# Patient Record
Sex: FEMALE | Race: WHITE | Employment: FULL TIME | URBAN - METROPOLITAN AREA
[De-identification: names, ages, dates, MRNs, and addresses within clinical notes are randomized per-mention and may not be internally consistent; named-entity substitution may affect disease eponyms.]

---

## 2021-11-16 ENCOUNTER — HISTORICAL (OUTPATIENT)
Dept: LAB | Facility: HOSPITAL | Age: 21
End: 2021-11-16

## 2021-11-16 LAB
T4 FREE SERPL-MCNC: 1 NG/DL (ref 0.7–1.48)
TSH SERPL-ACNC: 1.92 UIU/ML (ref 0.35–4.94)

## 2023-03-27 ENCOUNTER — HOSPITAL ENCOUNTER (EMERGENCY)
Facility: HOSPITAL | Age: 23
Discharge: HOME | End: 2023-03-27
Attending: INTERNAL MEDICINE
Payer: COMMERCIAL

## 2023-03-27 VITALS
WEIGHT: 140 LBS | BODY MASS INDEX: 26.43 KG/M2 | DIASTOLIC BLOOD PRESSURE: 88 MMHG | RESPIRATION RATE: 17 BRPM | SYSTOLIC BLOOD PRESSURE: 143 MMHG | HEIGHT: 61 IN | TEMPERATURE: 98 F | OXYGEN SATURATION: 100 % | HEART RATE: 103 BPM

## 2023-03-27 DIAGNOSIS — R00.2 PALPITATIONS: Primary | ICD-10-CM

## 2023-03-27 DIAGNOSIS — R00.0 TACHYCARDIA: ICD-10-CM

## 2023-03-27 DIAGNOSIS — F41.9 ANXIETY: ICD-10-CM

## 2023-03-27 DIAGNOSIS — E87.6 HYPOKALEMIA: ICD-10-CM

## 2023-03-27 LAB
ALBUMIN SERPL-MCNC: 4.4 G/DL (ref 3.5–5)
ALBUMIN/GLOB SERPL: 1.5 RATIO (ref 1.1–2)
ALP SERPL-CCNC: 90 UNIT/L (ref 40–150)
ALT SERPL-CCNC: 16 UNIT/L (ref 0–55)
APPEARANCE UR: CLEAR
AST SERPL-CCNC: 18 UNIT/L (ref 5–34)
B-HCG SERPL QL: NEGATIVE
BASOPHILS # BLD AUTO: 0.03 X10(3)/MCL (ref 0–0.2)
BASOPHILS NFR BLD AUTO: 0.4 %
BILIRUB UR QL STRIP.AUTO: NEGATIVE MG/DL
BILIRUBIN DIRECT+TOT PNL SERPL-MCNC: 0.4 MG/DL
BNP BLD-MCNC: <10 PG/ML
BUN SERPL-MCNC: 10 MG/DL (ref 7–18.7)
CALCIUM SERPL-MCNC: 9.6 MG/DL (ref 8.4–10.2)
CHLORIDE SERPL-SCNC: 108 MMOL/L (ref 98–107)
CO2 SERPL-SCNC: 23 MMOL/L (ref 22–29)
COLOR UR AUTO: YELLOW
CREAT SERPL-MCNC: 0.76 MG/DL (ref 0.55–1.02)
EOSINOPHIL # BLD AUTO: 0.08 X10(3)/MCL (ref 0–0.9)
EOSINOPHIL NFR BLD AUTO: 0.9 %
ERYTHROCYTE [DISTWIDTH] IN BLOOD BY AUTOMATED COUNT: 11.9 % (ref 11.5–17)
GFR SERPLBLD CREATININE-BSD FMLA CKD-EPI: >60 MLS/MIN/1.73/M2
GLOBULIN SER-MCNC: 3 GM/DL (ref 2.4–3.5)
GLUCOSE SERPL-MCNC: 107 MG/DL (ref 74–100)
GLUCOSE UR QL STRIP.AUTO: NEGATIVE MG/DL
HCT VFR BLD AUTO: 40.5 % (ref 37–47)
HGB BLD-MCNC: 13.9 G/DL (ref 12–16)
IMM GRANULOCYTES # BLD AUTO: 0.02 X10(3)/MCL (ref 0–0.04)
IMM GRANULOCYTES NFR BLD AUTO: 0.2 %
KETONES UR QL STRIP.AUTO: ABNORMAL MG/DL
LACTATE SERPL-SCNC: 0.7 MMOL/L (ref 0.5–2.2)
LEUKOCYTE ESTERASE UR QL STRIP.AUTO: NEGATIVE UNIT/L
LYMPHOCYTES # BLD AUTO: 2.8 X10(3)/MCL (ref 0.6–4.6)
LYMPHOCYTES NFR BLD AUTO: 33 %
MAGNESIUM SERPL-MCNC: 1.9 MG/DL (ref 1.6–2.6)
MCH RBC QN AUTO: 29 PG (ref 27–31)
MCHC RBC AUTO-ENTMCNC: 34.3 G/DL (ref 33–36)
MCV RBC AUTO: 84.6 FL (ref 80–94)
MONOCYTES # BLD AUTO: 0.62 X10(3)/MCL (ref 0.1–1.3)
MONOCYTES NFR BLD AUTO: 7.3 %
NEUTROPHILS # BLD AUTO: 4.93 X10(3)/MCL (ref 2.1–9.2)
NEUTROPHILS NFR BLD AUTO: 58.2 %
NITRITE UR QL STRIP.AUTO: NEGATIVE
PH UR STRIP.AUTO: 7 [PH]
PLATELET # BLD AUTO: 325 X10(3)/MCL (ref 130–400)
PMV BLD AUTO: 10.4 FL (ref 7.4–10.4)
POTASSIUM SERPL-SCNC: 3.1 MMOL/L (ref 3.5–5.1)
PROT SERPL-MCNC: 7.4 GM/DL (ref 6.4–8.3)
PROT UR QL STRIP.AUTO: NEGATIVE MG/DL
RBC # BLD AUTO: 4.79 X10(6)/MCL (ref 4.2–5.4)
RBC UR QL AUTO: NEGATIVE UNIT/L
SODIUM SERPL-SCNC: 140 MMOL/L (ref 136–145)
SP GR UR STRIP.AUTO: 1.02
UROBILINOGEN UR STRIP-ACNC: 0.2 MG/DL
WBC # SPEC AUTO: 8.5 X10(3)/MCL (ref 4.5–11.5)

## 2023-03-27 PROCEDURE — 80053 COMPREHEN METABOLIC PANEL: CPT | Performed by: INTERNAL MEDICINE

## 2023-03-27 PROCEDURE — 85025 COMPLETE CBC W/AUTO DIFF WBC: CPT | Performed by: INTERNAL MEDICINE

## 2023-03-27 PROCEDURE — 81003 URINALYSIS AUTO W/O SCOPE: CPT | Performed by: INTERNAL MEDICINE

## 2023-03-27 PROCEDURE — 63600175 PHARM REV CODE 636 W HCPCS: Performed by: INTERNAL MEDICINE

## 2023-03-27 PROCEDURE — 93005 ELECTROCARDIOGRAM TRACING: CPT

## 2023-03-27 PROCEDURE — 96361 HYDRATE IV INFUSION ADD-ON: CPT

## 2023-03-27 PROCEDURE — 99284 EMERGENCY DEPT VISIT MOD MDM: CPT | Mod: 25

## 2023-03-27 PROCEDURE — 93010 ELECTROCARDIOGRAM REPORT: CPT | Mod: ,,, | Performed by: INTERNAL MEDICINE

## 2023-03-27 PROCEDURE — 25000003 PHARM REV CODE 250: Performed by: INTERNAL MEDICINE

## 2023-03-27 PROCEDURE — 83735 ASSAY OF MAGNESIUM: CPT | Performed by: INTERNAL MEDICINE

## 2023-03-27 PROCEDURE — 81025 URINE PREGNANCY TEST: CPT | Performed by: INTERNAL MEDICINE

## 2023-03-27 PROCEDURE — 96365 THER/PROPH/DIAG IV INF INIT: CPT

## 2023-03-27 PROCEDURE — 83880 ASSAY OF NATRIURETIC PEPTIDE: CPT | Performed by: INTERNAL MEDICINE

## 2023-03-27 PROCEDURE — 93010 EKG 12-LEAD: ICD-10-PCS | Mod: ,,, | Performed by: INTERNAL MEDICINE

## 2023-03-27 PROCEDURE — 83605 ASSAY OF LACTIC ACID: CPT | Performed by: INTERNAL MEDICINE

## 2023-03-27 RX ORDER — MAGNESIUM SULFATE HEPTAHYDRATE 40 MG/ML
2 INJECTION, SOLUTION INTRAVENOUS
Status: COMPLETED | OUTPATIENT
Start: 2023-03-27 | End: 2023-03-27

## 2023-03-27 RX ORDER — BUSPIRONE HYDROCHLORIDE 15 MG/1
15 TABLET ORAL 3 TIMES DAILY
COMMUNITY

## 2023-03-27 RX ORDER — LEVOTHYROXINE SODIUM 25 UG/1
25 TABLET ORAL
COMMUNITY

## 2023-03-27 RX ORDER — LEVOCETIRIZINE DIHYDROCHLORIDE 5 MG/1
5 TABLET, FILM COATED ORAL NIGHTLY
COMMUNITY

## 2023-03-27 RX ORDER — PROPRANOLOL HYDROCHLORIDE 20 MG/1
20 TABLET ORAL
Status: COMPLETED | OUTPATIENT
Start: 2023-03-27 | End: 2023-03-27

## 2023-03-27 RX ORDER — SODIUM CHLORIDE 9 MG/ML
125 INJECTION, SOLUTION INTRAVENOUS ONCE
Status: COMPLETED | OUTPATIENT
Start: 2023-03-27 | End: 2023-03-27

## 2023-03-27 RX ORDER — PROPRANOLOL HYDROCHLORIDE 20 MG/1
20 TABLET ORAL EVERY 8 HOURS PRN
Qty: 90 TABLET | Refills: 0 | Status: SHIPPED | OUTPATIENT
Start: 2023-03-27 | End: 2023-04-26

## 2023-03-27 RX ADMIN — PROPRANOLOL HYDROCHLORIDE 20 MG: 20 TABLET ORAL at 05:03

## 2023-03-27 RX ADMIN — SODIUM CHLORIDE 1000 ML: 9 INJECTION, SOLUTION INTRAVENOUS at 03:03

## 2023-03-27 RX ADMIN — POTASSIUM BICARBONATE 40 MEQ: 782 TABLET, EFFERVESCENT ORAL at 04:03

## 2023-03-27 RX ADMIN — MAGNESIUM SULFATE HEPTAHYDRATE 2 G: 40 INJECTION, SOLUTION INTRAVENOUS at 04:03

## 2023-03-27 RX ADMIN — SODIUM CHLORIDE 125 ML/HR: 9 INJECTION, SOLUTION INTRAVENOUS at 04:03

## 2023-03-27 NOTE — ED PROVIDER NOTES
Encounter Date: 3/27/2023       History     Chief Complaint   Patient presents with    Palpitations     Pt states she was working when she felt chest pressure and palpitations.     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  She has a history of anxiety and is on BuSpar so she took whatever BuSpar and stated that it did not help and she continued to feel worse and lightheaded with palpitations so she came to the emergency department to have a an evaluation done    Review of patient's allergies indicates:  No Known Allergies  History reviewed. No pertinent past medical history.  No past surgical history on file.  History reviewed. No pertinent family history.     Review of Systems   Constitutional: Negative.  Negative for activity change, appetite change, chills, diaphoresis, fatigue, fever and unexpected weight change.   HENT: Negative.  Negative for congestion, dental problem, drooling, ear discharge, ear pain, facial swelling, hearing loss, mouth sores, nosebleeds, postnasal drip, rhinorrhea, sinus pressure, sinus pain, sneezing, sore throat, tinnitus, trouble swallowing and voice change.    Eyes: Negative.  Negative for photophobia, pain, discharge, redness, itching and visual disturbance.   Respiratory:  Positive for chest tightness. Negative for apnea, cough, choking, shortness of breath, wheezing and stridor.    Cardiovascular:  Positive for palpitations. Negative for chest pain and leg swelling.   Gastrointestinal: Negative.  Negative for abdominal distention, abdominal pain, anal bleeding, blood in stool, constipation, diarrhea, nausea, rectal pain and vomiting.   Endocrine: Negative.  Negative for cold intolerance, heat intolerance, polydipsia, polyphagia and polyuria.   Genitourinary: Negative.  Negative for decreased urine volume, difficulty urinating, dyspareunia, dysuria, enuresis, flank pain, frequency, genital sores, hematuria, menstrual  problem, pelvic pain, urgency, vaginal bleeding, vaginal discharge and vaginal pain.   Musculoskeletal: Negative.  Negative for arthralgias, back pain, gait problem, joint swelling, myalgias, neck pain and neck stiffness.   Skin: Negative.  Negative for color change, pallor, rash and wound.   Allergic/Immunologic: Negative.  Negative for environmental allergies, food allergies and immunocompromised state.   Neurological: Negative.  Negative for dizziness, tremors, seizures, syncope, facial asymmetry, speech difficulty, weakness, light-headedness, numbness and headaches.   Hematological: Negative.  Negative for adenopathy. Does not bruise/bleed easily.   Psychiatric/Behavioral: Negative.  Negative for agitation, behavioral problems, confusion, decreased concentration, dysphoric mood, hallucinations, self-injury, sleep disturbance and suicidal ideas. The patient is not nervous/anxious and is not hyperactive.    All other systems reviewed and are negative.    Physical Exam     Initial Vitals [03/27/23 0250]   BP Pulse Resp Temp SpO2   (!) 160/117 (!) 118 20 98 °F (36.7 °C) 100 %      MAP       --         Physical Exam    Nursing note and vitals reviewed.  Constitutional: She appears well-developed and well-nourished. She is not diaphoretic. No distress.   HENT:   Head: Normocephalic and atraumatic.   Mouth/Throat: Oropharynx is clear and moist. No oropharyngeal exudate.   Eyes: Conjunctivae and EOM are normal. Pupils are equal, round, and reactive to light. No scleral icterus.   Neck: Neck supple. No JVD present.   Normal range of motion.  Cardiovascular:  Regular rhythm, normal heart sounds and intact distal pulses.     Exam reveals no gallop and no friction rub.       No murmur heard.  Tachycardic   Pulmonary/Chest: Breath sounds normal. No respiratory distress. She has no wheezes. She exhibits no tenderness.   Abdominal: Abdomen is soft. Bowel sounds are normal. She exhibits no distension. There is no abdominal  tenderness. There is no rebound.   Musculoskeletal:         General: Normal range of motion.      Cervical back: Normal range of motion and neck supple.     Neurological: She is alert and oriented to person, place, and time. She has normal strength and normal reflexes.   Skin: Skin is warm and dry. Capillary refill takes less than 2 seconds.   Psychiatric: Her behavior is normal. Judgment and thought content normal. Her mood appears anxious.       ED Course   Procedures  Admission on 03/27/2023   Component Date Value Ref Range Status    Sodium Level 03/27/2023 140  136 - 145 mmol/L Final    Potassium Level 03/27/2023 3.1 (L)  3.5 - 5.1 mmol/L Final    Chloride 03/27/2023 108 (H)  98 - 107 mmol/L Final    Carbon Dioxide 03/27/2023 23  22 - 29 mmol/L Final    Glucose Level 03/27/2023 107 (H)  74 - 100 mg/dL Final    Blood Urea Nitrogen 03/27/2023 10.0  7.0 - 18.7 mg/dL Final    Creatinine 03/27/2023 0.76  0.55 - 1.02 mg/dL Final    Calcium Level Total 03/27/2023 9.6  8.4 - 10.2 mg/dL Final    Protein Total 03/27/2023 7.4  6.4 - 8.3 gm/dL Final    Albumin Level 03/27/2023 4.4  3.5 - 5.0 g/dL Final    Globulin 03/27/2023 3.0  2.4 - 3.5 gm/dL Final    Albumin/Globulin Ratio 03/27/2023 1.5  1.1 - 2.0 ratio Final    Bilirubin Total 03/27/2023 0.4  <=1.5 mg/dL Final    Alkaline Phosphatase 03/27/2023 90  40 - 150 unit/L Final    Alanine Aminotransferase 03/27/2023 16  0 - 55 unit/L Final    Aspartate Aminotransferase 03/27/2023 18  5 - 34 unit/L Final    eGFR 03/27/2023 >60  mls/min/1.73/m2 Final    Color, UA 03/27/2023 Yellow  Yellow, Light-Yellow, Dark Yellow, Imelda, Straw Final    Appearance, UA 03/27/2023 Clear  Clear Final    Specific Gravity, UA 03/27/2023 1.025   Final    pH, UA 03/27/2023 7.0  5.0 - 8.5 Final    Protein, UA 03/27/2023 Negative  Negative mg/dL Final    Glucose, UA 03/27/2023 Negative  Negative, Normal mg/dL Final    Ketones, UA 03/27/2023 2+ (A)  Negative mg/dL Final    Blood, UA 03/27/2023 Negative   Negative unit/L Final    Bilirubin, UA 03/27/2023 Negative  Negative mg/dL Final    Urobilinogen, UA 03/27/2023 0.2  0.2, 1.0, Normal mg/dL Final    Nitrites, UA 03/27/2023 Negative  Negative Final    Leukocyte Esterase, UA 03/27/2023 Negative  Negative unit/L Final    Beta hCG Qualitative, Urine 03/27/2023 Negative  Negative Final    Lactic Acid Level 03/27/2023 0.7  0.5 - 2.2 mmol/L Final    Natriuretic Peptide 03/27/2023 <10.0  <=100.0 pg/mL Final    Magnesium Level 03/27/2023 1.90  1.60 - 2.60 mg/dL Final    WBC 03/27/2023 8.5  4.5 - 11.5 x10(3)/mcL Final    RBC 03/27/2023 4.79  4.20 - 5.40 x10(6)/mcL Final    Hgb 03/27/2023 13.9  12.0 - 16.0 g/dL Final    Hct 03/27/2023 40.5  37.0 - 47.0 % Final    MCV 03/27/2023 84.6  80.0 - 94.0 fL Final    MCH 03/27/2023 29.0  27.0 - 31.0 pg Final    MCHC 03/27/2023 34.3  33.0 - 36.0 g/dL Final    RDW 03/27/2023 11.9  11.5 - 17.0 % Final    Platelet 03/27/2023 325  130 - 400 x10(3)/mcL Final    MPV 03/27/2023 10.4  7.4 - 10.4 fL Final    Neut % 03/27/2023 58.2  % Final    Lymph % 03/27/2023 33.0  % Final    Mono % 03/27/2023 7.3  % Final    Eos % 03/27/2023 0.9  % Final    Basophil % 03/27/2023 0.4  % Final    Lymph # 03/27/2023 2.80  0.6 - 4.6 x10(3)/mcL Final    Neut # 03/27/2023 4.93  2.1 - 9.2 x10(3)/mcL Final    Mono # 03/27/2023 0.62  0.1 - 1.3 x10(3)/mcL Final    Eos # 03/27/2023 0.08  0 - 0.9 x10(3)/mcL Final    Baso # 03/27/2023 0.03  0 - 0.2 x10(3)/mcL Final    IG# 03/27/2023 0.02  0 - 0.04 x10(3)/mcL Final    IG% 03/27/2023 0.2  % Final      Labs Reviewed   COMPREHENSIVE METABOLIC PANEL - Abnormal; Notable for the following components:       Result Value    Potassium Level 3.1 (*)     Chloride 108 (*)     Glucose Level 107 (*)     All other components within normal limits   URINALYSIS, REFLEX TO URINE CULTURE - Abnormal; Notable for the following components:    Ketones, UA 2+ (*)     All other components within normal limits   PREGNANCY TEST, URINE RAPID - Normal    LACTIC ACID, PLASMA - Normal   B-TYPE NATRIURETIC PEPTIDE - Normal   MAGNESIUM - Normal   CBC W/ AUTO DIFFERENTIAL    Narrative:     The following orders were created for panel order CBC auto differential.  Procedure                               Abnormality         Status                     ---------                               -----------         ------                     CBC with Differential[[id number]]                            Final result                 Please view results for these tests on the individual orders.   CBC WITH DIFFERENTIAL     EKG Readings: (Independently Interpreted)   Initial Reading: No STEMI. Rhythm: Sinus Tachycardia.   EKG done at 2:51 a.m. on March 27, 2023 shows sinus tachycardia with a ventricular rate of 121 beats per minute     Imaging Results    None          Medications   sodium chloride 0.9% bolus 1,000 mL 1,000 mL (0 mLs Intravenous Stopped 3/27/23 0407)   0.9%  NaCl infusion (125 mL/hr Intravenous New Bag 3/27/23 3088)   potassium bicarbonate disintegrating tablet 40 mEq (40 mEq Oral Given 3/27/23 9867)   magnesium sulfate 2g in water 50mL IVPB (premix) (0 g Intravenous Stopped 3/27/23 4785)   propranoloL tablet 20 mg (20 mg Oral Given 3/27/23 1833)                       22-year-old white female presents emergency department with palpitations and tachycardia.  She does have a history of anxiety and it taken a BuSpar prior to coming to the ER however she was continuously having palpitations.  IV was started and workup was begun which revealed hypokalemia and low blood magnesium level she does admit to longstanding intermittent bouts of palpitations and while she was in the ER on the monitor she had varying heart rates from the 90s to 120s.  We replaced her potassium and magnesium and gave her a dose of propanolol and this completely resolved her symptoms so will send home with a dose of propranolol for prescription to take as needed she can follow up with the primary care  physician       Clinical Impression:   Final diagnoses:  [R00.0] Tachycardia  [R00.2] Palpitations (Primary)  [E87.6] Hypokalemia  [F41.9] Anxiety        ED Disposition Condition    Discharge Stable          ED Prescriptions       Medication Sig Dispense Start Date End Date Auth. Provider    propranoloL (INDERAL) 20 MG tablet Take 1 tablet (20 mg total) by mouth every 8 (eight) hours as needed (Tachycardia or palpitations). 90 tablet 3/27/2023 4/26/2023 Orlin Torres MD          Follow-up Information       Follow up With Specialties Details Why Contact Info    Primary care physician  In 3 days            Sophie Parmar is a certified MA and was present during the entire interaction with this patient       Orlin Torres MD  03/27/23 6596

## 2023-06-04 ENCOUNTER — HOSPITAL ENCOUNTER (EMERGENCY)
Facility: HOSPITAL | Age: 23
Discharge: HOME | End: 2023-06-04
Attending: FAMILY MEDICINE
Payer: COMMERCIAL

## 2023-06-04 VITALS
RESPIRATION RATE: 17 BRPM | HEART RATE: 72 BPM | DIASTOLIC BLOOD PRESSURE: 82 MMHG | BODY MASS INDEX: 26.43 KG/M2 | TEMPERATURE: 98 F | HEIGHT: 61 IN | WEIGHT: 140 LBS | OXYGEN SATURATION: 97 % | SYSTOLIC BLOOD PRESSURE: 125 MMHG

## 2023-06-04 DIAGNOSIS — R07.9 CHEST PAIN: ICD-10-CM

## 2023-06-04 DIAGNOSIS — R07.89 ATYPICAL CHEST PAIN: Primary | ICD-10-CM

## 2023-06-04 LAB
ALBUMIN SERPL-MCNC: 4.5 G/DL (ref 3.5–5)
ALBUMIN/GLOB SERPL: 1.3 RATIO (ref 1.1–2)
ALP SERPL-CCNC: 103 UNIT/L (ref 40–150)
ALT SERPL-CCNC: 12 UNIT/L (ref 0–55)
APPEARANCE UR: CLEAR
AST SERPL-CCNC: 21 UNIT/L (ref 5–34)
B-HCG SERPL QL: NEGATIVE
BACTERIA #/AREA URNS AUTO: NORMAL /HPF
BASOPHILS # BLD AUTO: 0.06 X10(3)/MCL
BASOPHILS NFR BLD AUTO: 0.6 %
BILIRUB UR QL STRIP.AUTO: NEGATIVE MG/DL
BILIRUBIN DIRECT+TOT PNL SERPL-MCNC: 0.3 MG/DL
BUN SERPL-MCNC: 9 MG/DL (ref 7–18.7)
CALCIUM SERPL-MCNC: 9.3 MG/DL (ref 8.4–10.2)
CHLORIDE SERPL-SCNC: 104 MMOL/L (ref 98–107)
CO2 SERPL-SCNC: 25 MMOL/L (ref 22–29)
COLOR UR: YELLOW
CREAT SERPL-MCNC: 0.8 MG/DL (ref 0.55–1.02)
EOSINOPHIL # BLD AUTO: 0.09 X10(3)/MCL (ref 0–0.9)
EOSINOPHIL NFR BLD AUTO: 0.9 %
ERYTHROCYTE [DISTWIDTH] IN BLOOD BY AUTOMATED COUNT: 12.3 % (ref 11.5–17)
GFR SERPLBLD CREATININE-BSD FMLA CKD-EPI: >60 MLS/MIN/1.73/M2
GLOBULIN SER-MCNC: 3.5 GM/DL (ref 2.4–3.5)
GLUCOSE SERPL-MCNC: 101 MG/DL (ref 74–100)
GLUCOSE UR QL STRIP.AUTO: NEGATIVE MG/DL
HCT VFR BLD AUTO: 42.7 % (ref 37–47)
HGB BLD-MCNC: 14.4 G/DL (ref 12–16)
IMM GRANULOCYTES # BLD AUTO: 0.05 X10(3)/MCL (ref 0–0.04)
IMM GRANULOCYTES NFR BLD AUTO: 0.5 %
KETONES UR QL STRIP.AUTO: ABNORMAL MG/DL
LEUKOCYTE ESTERASE UR QL STRIP.AUTO: NEGATIVE UNIT/L
LYMPHOCYTES # BLD AUTO: 2.82 X10(3)/MCL (ref 0.6–4.6)
LYMPHOCYTES NFR BLD AUTO: 29 %
MCH RBC QN AUTO: 28.6 PG (ref 27–31)
MCHC RBC AUTO-ENTMCNC: 33.7 G/DL (ref 33–36)
MCV RBC AUTO: 84.9 FL (ref 80–94)
MONOCYTES # BLD AUTO: 0.65 X10(3)/MCL (ref 0.1–1.3)
MONOCYTES NFR BLD AUTO: 6.7 %
NEUTROPHILS # BLD AUTO: 6.06 X10(3)/MCL (ref 2.1–9.2)
NEUTROPHILS NFR BLD AUTO: 62.3 %
NITRITE UR QL STRIP.AUTO: NEGATIVE
PH UR STRIP.AUTO: 7.5 [PH]
PLATELET # BLD AUTO: 339 X10(3)/MCL (ref 130–400)
PMV BLD AUTO: 10.4 FL (ref 7.4–10.4)
POTASSIUM SERPL-SCNC: 3.6 MMOL/L (ref 3.5–5.1)
PROT SERPL-MCNC: 8 GM/DL (ref 6.4–8.3)
PROT UR QL STRIP.AUTO: ABNORMAL MG/DL
RBC # BLD AUTO: 5.03 X10(6)/MCL (ref 4.2–5.4)
RBC #/AREA URNS AUTO: NORMAL /HPF
RBC UR QL AUTO: ABNORMAL UNIT/L
SODIUM SERPL-SCNC: 138 MMOL/L (ref 136–145)
SP GR UR STRIP.AUTO: 1.02
SQUAMOUS #/AREA URNS AUTO: NORMAL /HPF
TROPONIN I SERPL-MCNC: <0.01 NG/ML (ref 0–0.04)
UROBILINOGEN UR STRIP-ACNC: 1 MG/DL
WBC # SPEC AUTO: 9.73 X10(3)/MCL (ref 4.5–11.5)
WBC #/AREA URNS AUTO: NORMAL /HPF

## 2023-06-04 PROCEDURE — 99285 EMERGENCY DEPT VISIT HI MDM: CPT | Mod: 25

## 2023-06-04 PROCEDURE — 81001 URINALYSIS AUTO W/SCOPE: CPT | Performed by: FAMILY MEDICINE

## 2023-06-04 PROCEDURE — 93005 ELECTROCARDIOGRAM TRACING: CPT

## 2023-06-04 PROCEDURE — 25000003 PHARM REV CODE 250: Performed by: FAMILY MEDICINE

## 2023-06-04 PROCEDURE — 80053 COMPREHEN METABOLIC PANEL: CPT | Performed by: FAMILY MEDICINE

## 2023-06-04 PROCEDURE — 84484 ASSAY OF TROPONIN QUANT: CPT | Performed by: FAMILY MEDICINE

## 2023-06-04 PROCEDURE — 81025 URINE PREGNANCY TEST: CPT | Performed by: FAMILY MEDICINE

## 2023-06-04 PROCEDURE — 85025 COMPLETE CBC W/AUTO DIFF WBC: CPT | Performed by: FAMILY MEDICINE

## 2023-06-04 RX ORDER — HYDROXYZINE PAMOATE 25 MG/1
25 CAPSULE ORAL
Status: COMPLETED | OUTPATIENT
Start: 2023-06-04 | End: 2023-06-04

## 2023-06-04 RX ADMIN — HYDROXYZINE PAMOATE 25 MG: 25 CAPSULE ORAL at 06:06

## 2023-06-04 NOTE — ED PROVIDER NOTES
Encounter Date: 6/4/2023       History     Chief Complaint   Patient presents with    Chest Pain     Pt comes to the ER complaining of chest pain, states that it started about an hour ago. States that she began having some chest pain and she took her propanol. Not long after she states that the pain started radiating to her arms as well.      Patient is a 22-year-old female presents emergency room with complaints of sharp chest pain and palpitations that began approximately an hour prior arrival.  Patient currently works here in the hospital, reports she felt in her normal state of health, had a sudden onset of sharp right-sided chest pain which radiated to the right shoulder.  Denies associated nausea or vomiting.  Denies associated shortness breath.  When symptoms not improve, patient came emergency room for evaluation.  Patient reports a history of hypothyroidism, currently compliant with medications.  Patient has had similar symptoms in the past, and is being treated with propranolol.    The history is provided by the patient.   Review of patient's allergies indicates:  No Known Allergies  History reviewed. No pertinent past medical history.  History reviewed. No pertinent surgical history.  History reviewed. No pertinent family history.     Review of Systems   Constitutional:  Negative for chills, fatigue and fever.   HENT:  Negative for ear pain, rhinorrhea and sore throat.    Eyes:  Negative for photophobia and pain.   Respiratory:  Negative for cough, shortness of breath and wheezing.    Cardiovascular:  Positive for chest pain and palpitations.   Gastrointestinal:  Negative for abdominal pain, diarrhea, nausea and vomiting.   Genitourinary:  Negative for dysuria.   Neurological:  Negative for dizziness, weakness and headaches.   All other systems reviewed and are negative.    Physical Exam     Initial Vitals [06/04/23 0623]   BP Pulse Resp Temp SpO2   (!) 162/108 80 18 98.2 °F (36.8 °C) 100 %      MAP        --         Physical Exam    Nursing note and vitals reviewed.  Constitutional: She appears well-developed and well-nourished. No distress.   HENT:   Head: Normocephalic and atraumatic.   Eyes: Conjunctivae and EOM are normal. Pupils are equal, round, and reactive to light.   Neck: Neck supple.   Normal range of motion.  Cardiovascular:  Normal rate, regular rhythm, normal heart sounds and intact distal pulses.           Pulmonary/Chest: Breath sounds normal. No respiratory distress. She has no wheezes. She has no rhonchi. She has no rales. She exhibits no tenderness.   Abdominal: Abdomen is soft. Bowel sounds are normal. She exhibits no distension. There is no abdominal tenderness. There is no rebound and no guarding.   Musculoskeletal:         General: Normal range of motion.      Cervical back: Normal range of motion and neck supple.     Neurological: She is alert and oriented to person, place, and time.   Skin: Skin is warm and dry. Capillary refill takes less than 2 seconds. No erythema.   Psychiatric: She has a normal mood and affect. Her behavior is normal. Judgment and thought content normal.       ED Course   Procedures  Labs Reviewed   COMPREHENSIVE METABOLIC PANEL - Abnormal; Notable for the following components:       Result Value    Glucose Level 101 (*)     All other components within normal limits   URINALYSIS, REFLEX TO URINE CULTURE - Abnormal; Notable for the following components:    Protein, UA Trace (*)     Ketones, UA Trace (*)     Blood, UA 2+ (*)     All other components within normal limits   CBC WITH DIFFERENTIAL - Abnormal; Notable for the following components:    IG# 0.05 (*)     All other components within normal limits   TROPONIN I - Normal   PREGNANCY TEST, URINE RAPID - Normal   URINALYSIS, MICROSCOPIC - Normal   CBC W/ AUTO DIFFERENTIAL    Narrative:     The following orders were created for panel order CBC Auto Differential.  Procedure                               Abnormality          Status                     ---------                               -----------         ------                     CBC with Differential[[id number]]        Abnormal            Final result                 Please view results for these tests on the individual orders.     EKG Readings: (Independently Interpreted)   My Independent EKG Interpretation  06/04/2023 6:26 AM  Rate: 72 bpm  Rhythm: Sinus  Axis: Normal  Intervals: Normal  ST Changes: None  Impression: Normal sinus rhythm, normal EKG.         Imaging Results              X-Ray Chest 1 View (Preliminary result)  Result time 06/04/23 07:56:02      Wet Read by Bernardo Bauer MD (06/04/23 07:56:02, [facility] - Emergency Dept, Emergency Medicine)    No acute abnormality                                     Medications   hydrOXYzine pamoate capsule 25 mg (25 mg Oral Given 6/4/23 0641)     Medical Decision Making:   Initial Assessment:   Patient is a 22-year-old female with a history of hypothyroidism presents emergency room complaints patient's sharp chest pain.  Currently appears well in no acute distress.  No acute EKG findings appreciated.  On review of patient's previous ER visits, she has been seen before with palpitations.  No cardiac origin appreciated.  Of note, during last set of laboratory patient has hypokalemia.  Will obtain laboratory evaluation while here in the emergency room.  Will give Vistaril to see if this helps with her symptoms.  Continue to monitor.  Currently unable to run TSH due to this instrument being down.  Differential Diagnosis:   Atypical chest pain, electrolyte abnormality, palpitations, hyperthyroidism           ED Course as of 06/04/23 0756   Sun Jun 04, 2023   0706 Occult Blood UA(!): 2+ [MW]   0707 Bilirubin, UA: Negative [MW]   0707 Urobilinogen, UA: 1.0 [MW]   0707 Protein, UA(!): Trace [MW]   0707 WBC: 9.73 [MW]   0707 Hemoglobin: 14.4 [MW]   0707 Hematocrit: 42.7 [MW]   0707 Platelets: 339 [MW]   0723  Troponin I: <0.010 [MW]   0724 Sodium: 138 [MW]   0724 Potassium: 3.6 [MW]   0724 Chloride: 104 [MW]   0724 CO2: 25 [MW]   0724 Glucose(!): 101 [MW]   0724 BUN: 9.0 [MW]   0724 Creatinine: 0.80 [MW]   0724 Calcium: 9.3 [MW]   0724 PROTEIN TOTAL: 8.0 [MW]   0724 Albumin: 4.5 [MW]   0724 Globulin, Total: 3.5 [MW]   0724 Albumin/Globulin Ratio: 1.3 [MW]   0724 BILIRUBIN TOTAL: 0.3 [MW]   0724 Alkaline Phosphatase: 103 [MW]   0724 ALT: 12 [MW]   0724 AST: 21 [MW]   0724 eGFR: >60 [MW]   0724 Bacteria, UA: Rare [MW]   0724 RBC, UA: 0-5 [MW]   0724 WBC, UA: None Seen [MW]   0724 Squam Epithel, UA: Rare [MW]   0724 Preg Test, Ur: Negative [MW]   0724 Troponin I: <0.010 [MW]   0728 Patient feeling improved; no longer having chest pain or palpitations.  No acute lab abnormalities appreciated.  Awaiting on images of CXR to post for review.  Otherwise, stable for discharge to home.  Reassurance given to the patient. [MW]      ED Course User Index  [MW] Bernardo Bauer MD                 Clinical Impression:   Final diagnoses:  [R07.9] Chest pain  [R07.89] Atypical chest pain (Primary)        ED Disposition Condition    Discharge Stable          ED Prescriptions    None       Follow-up Information       Follow up With Specialties Details Why Contact Info    Ko Clayton DO Family Medicine   [id number]  [facility] [address]  [facility] [address] [phone]      [facility] - Emergency Dept Emergency Medicine  As needed, If symptoms worsen [address] [id number] [phone]             Bernardo Bauer MD  06/04/23 4416

## 2023-06-04 NOTE — Clinical Note
"Natasha Coyne (Kelsey)t was seen and treated in our emergency department on 6/4/2023.  She may return to work on 06/05/2023.       If you have any questions or concerns, please don't hesitate to call.       RN    "

## 2023-12-17 ENCOUNTER — HOSPITAL ENCOUNTER (EMERGENCY)
Facility: HOSPITAL | Age: 23
Discharge: HOME | End: 2023-12-17
Attending: GENERAL ACUTE CARE HOSPITAL
Payer: COMMERCIAL

## 2023-12-17 VITALS
HEART RATE: 87 BPM | WEIGHT: 130 LBS | RESPIRATION RATE: 16 BRPM | OXYGEN SATURATION: 98 % | BODY MASS INDEX: 24.55 KG/M2 | DIASTOLIC BLOOD PRESSURE: 90 MMHG | TEMPERATURE: 98 F | SYSTOLIC BLOOD PRESSURE: 132 MMHG | HEIGHT: 61 IN

## 2023-12-17 DIAGNOSIS — N73.9 PELVIC INFLAMMATORY DISEASE: Primary | ICD-10-CM

## 2023-12-17 LAB
ALBUMIN SERPL-MCNC: 4.3 G/DL (ref 3.5–5)
ALBUMIN/GLOB SERPL: 1.4 RATIO (ref 1.1–2)
ALP SERPL-CCNC: 104 UNIT/L (ref 40–150)
ALT SERPL-CCNC: 13 UNIT/L (ref 0–55)
APPEARANCE UR: CLEAR
AST SERPL-CCNC: 19 UNIT/L (ref 5–34)
B-HCG SERPL QL: NEGATIVE
BACTERIA #/AREA URNS AUTO: ABNORMAL /HPF
BASOPHILS # BLD AUTO: 0.03 X10(3)/MCL
BASOPHILS NFR BLD AUTO: 0.3 %
BILIRUB SERPL-MCNC: 0.2 MG/DL
BILIRUB UR QL STRIP.AUTO: ABNORMAL
BUN SERPL-MCNC: 12 MG/DL (ref 7–18.7)
CALCIUM SERPL-MCNC: 9.4 MG/DL (ref 8.4–10.2)
CHLORIDE SERPL-SCNC: 104 MMOL/L (ref 98–107)
CLUE CELLS VAG QL WET PREP: ABNORMAL
CO2 SERPL-SCNC: 25 MMOL/L (ref 22–29)
COLOR UR AUTO: YELLOW
CREAT SERPL-MCNC: 0.76 MG/DL (ref 0.55–1.02)
EOSINOPHIL # BLD AUTO: 0.24 X10(3)/MCL (ref 0–0.9)
EOSINOPHIL NFR BLD AUTO: 2.3 %
ERYTHROCYTE [DISTWIDTH] IN BLOOD BY AUTOMATED COUNT: 12.1 % (ref 11.5–17)
FLUAV AG UPPER RESP QL IA.RAPID: NOT DETECTED
FLUBV AG UPPER RESP QL IA.RAPID: NOT DETECTED
GFR SERPLBLD CREATININE-BSD FMLA CKD-EPI: >60 MLS/MIN/1.73/M2
GLOBULIN SER-MCNC: 3.1 GM/DL (ref 2.4–3.5)
GLUCOSE SERPL-MCNC: 91 MG/DL (ref 74–100)
GLUCOSE UR QL STRIP.AUTO: NEGATIVE
HCT VFR BLD AUTO: 42.8 % (ref 37–47)
HGB BLD-MCNC: 14.6 G/DL (ref 12–16)
IMM GRANULOCYTES # BLD AUTO: 0.05 X10(3)/MCL (ref 0–0.04)
IMM GRANULOCYTES NFR BLD AUTO: 0.5 %
KETONES UR QL STRIP.AUTO: ABNORMAL
LEUKOCYTE ESTERASE UR QL STRIP.AUTO: ABNORMAL
LIPASE SERPL-CCNC: 30 U/L
LYMPHOCYTES # BLD AUTO: 2.49 X10(3)/MCL (ref 0.6–4.6)
LYMPHOCYTES NFR BLD AUTO: 23.6 %
MAGNESIUM SERPL-MCNC: 2.2 MG/DL (ref 1.6–2.6)
MCH RBC QN AUTO: 29.3 PG (ref 27–31)
MCHC RBC AUTO-ENTMCNC: 34.1 G/DL (ref 33–36)
MCV RBC AUTO: 85.9 FL (ref 80–94)
MONOCYTES # BLD AUTO: 0.61 X10(3)/MCL (ref 0.1–1.3)
MONOCYTES NFR BLD AUTO: 5.8 %
MUCOUS THREADS URNS QL MICRO: ABNORMAL /LPF
NEUTROPHILS # BLD AUTO: 7.15 X10(3)/MCL (ref 2.1–9.2)
NEUTROPHILS NFR BLD AUTO: 67.5 %
NITRITE UR QL STRIP.AUTO: NEGATIVE
PH UR STRIP.AUTO: 6 [PH]
PLATELET # BLD AUTO: 315 X10(3)/MCL (ref 130–400)
PMV BLD AUTO: 9.9 FL (ref 7.4–10.4)
POTASSIUM SERPL-SCNC: 3.6 MMOL/L (ref 3.5–5.1)
PROT SERPL-MCNC: 7.4 GM/DL (ref 6.4–8.3)
PROT UR QL STRIP.AUTO: ABNORMAL
RBC # BLD AUTO: 4.98 X10(6)/MCL (ref 4.2–5.4)
RBC #/AREA URNS AUTO: ABNORMAL /HPF
RBC UR QL AUTO: ABNORMAL
SARS-COV-2 RNA RESP QL NAA+PROBE: NOT DETECTED
SODIUM SERPL-SCNC: 139 MMOL/L (ref 136–145)
SP GR UR STRIP.AUTO: >=1.03 (ref 1–1.03)
SQUAMOUS #/AREA URNS AUTO: ABNORMAL /HPF
T VAGINALIS VAG QL WET PREP: ABNORMAL
UROBILINOGEN UR STRIP-ACNC: 0.2
WBC # SPEC AUTO: 10.57 X10(3)/MCL (ref 4.5–11.5)
WBC #/AREA URNS AUTO: ABNORMAL /HPF
WBC #/AREA VAG WET PREP: ABNORMAL
YEAST SPEC QL WET PREP: ABNORMAL

## 2023-12-17 PROCEDURE — 96372 THER/PROPH/DIAG INJ SC/IM: CPT | Performed by: NURSE PRACTITIONER

## 2023-12-17 PROCEDURE — 81025 URINE PREGNANCY TEST: CPT | Performed by: NURSE PRACTITIONER

## 2023-12-17 PROCEDURE — 87210 SMEAR WET MOUNT SALINE/INK: CPT | Performed by: NURSE PRACTITIONER

## 2023-12-17 PROCEDURE — 99284 EMERGENCY DEPT VISIT MOD MDM: CPT

## 2023-12-17 PROCEDURE — 63600175 PHARM REV CODE 636 W HCPCS: Performed by: NURSE PRACTITIONER

## 2023-12-17 PROCEDURE — 80053 COMPREHEN METABOLIC PANEL: CPT | Performed by: NURSE PRACTITIONER

## 2023-12-17 PROCEDURE — 81001 URINALYSIS AUTO W/SCOPE: CPT | Performed by: NURSE PRACTITIONER

## 2023-12-17 PROCEDURE — 0240U COVID/FLU A&B PCR: CPT | Performed by: NURSE PRACTITIONER

## 2023-12-17 PROCEDURE — 85025 COMPLETE CBC W/AUTO DIFF WBC: CPT | Performed by: NURSE PRACTITIONER

## 2023-12-17 PROCEDURE — 83690 ASSAY OF LIPASE: CPT | Performed by: NURSE PRACTITIONER

## 2023-12-17 PROCEDURE — 83735 ASSAY OF MAGNESIUM: CPT | Performed by: NURSE PRACTITIONER

## 2023-12-17 PROCEDURE — 87591 N.GONORRHOEAE DNA AMP PROB: CPT | Performed by: NURSE PRACTITIONER

## 2023-12-17 RX ORDER — CEFTRIAXONE 1 G/1
1 INJECTION, POWDER, FOR SOLUTION INTRAMUSCULAR; INTRAVENOUS
Status: COMPLETED | OUTPATIENT
Start: 2023-12-17 | End: 2023-12-17

## 2023-12-17 RX ORDER — DOXYCYCLINE 100 MG/1
100 CAPSULE ORAL EVERY 12 HOURS
Qty: 28 CAPSULE | Refills: 0 | Status: SHIPPED | OUTPATIENT
Start: 2023-12-17 | End: 2023-12-31

## 2023-12-17 RX ORDER — METRONIDAZOLE 500 MG/1
500 TABLET ORAL EVERY 12 HOURS
Qty: 28 TABLET | Refills: 0 | Status: SHIPPED | OUTPATIENT
Start: 2023-12-17 | End: 2023-12-31

## 2023-12-17 RX ADMIN — CEFTRIAXONE SODIUM 1 G: 1 INJECTION, POWDER, FOR SOLUTION INTRAMUSCULAR; INTRAVENOUS at 08:12

## 2023-12-18 LAB
C TRACH DNA SPEC QL NAA+PROBE: NOT DETECTED
N GONORRHOEA DNA SPEC QL NAA+PROBE: NOT DETECTED
SOURCE (OHS): NORMAL

## 2023-12-18 NOTE — ED PROVIDER NOTES
Encounter Date: 12/17/2023       History     Chief Complaint   Patient presents with    Influenza     Flu like symptoms started yesterday   also had IUD placed a mnonth ago and hasn't felt  good since   OB sent to ED for U/s to check palcement     See MDM    The history is provided by the patient. No  was used.     Review of patient's allergies indicates:  No Known Allergies  No past medical history on file.  No past surgical history on file.  No family history on file.     Review of Systems   Gastrointestinal:  Positive for abdominal pain. Negative for constipation, nausea and vomiting.   Genitourinary:  Positive for vaginal discharge (white/pinkish). Negative for dysuria.   Neurological:  Positive for dizziness and headaches.   All other systems reviewed and are negative.      Physical Exam     Initial Vitals [12/17/23 1820]   BP Pulse Resp Temp SpO2   (!) 136/90 99 16 98.4 °F (36.9 °C) 99 %      MAP       --         Physical Exam    Nursing note and vitals reviewed.  Constitutional: She appears well-developed and well-nourished.   Cardiovascular:  Normal rate, regular rhythm and normal heart sounds.           Pulmonary/Chest: Breath sounds normal. No respiratory distress.   Abdominal: Abdomen is soft. Bowel sounds are normal. She exhibits no distension. There is abdominal tenderness (diffuse, mild).   Genitourinary: Cervix exhibits friability. Cervix exhibits no motion tenderness. Right adnexum displays no tenderness. Left adnexum displays no tenderness.    Genitourinary Comments: Two strings noted from cervix, IUD appears to be in place           Neurological: She is alert and oriented to person, place, and time.   Skin: Skin is warm and dry.   Psychiatric: She has a normal mood and affect.         ED Course   Procedures  Labs Reviewed   WET PREP, GENITAL - Abnormal; Notable for the following components:       Result Value    WBC, Wet Prep Few (*)     All other components within normal limits    URINALYSIS, REFLEX TO URINE CULTURE - Abnormal; Notable for the following components:    Protein, UA Trace (*)     Ketones, UA Trace (*)     Blood, UA 3+ (*)     Bilirubin, UA 1+ (*)     Leukocyte Esterase, UA Trace (*)     All other components within normal limits   CBC WITH DIFFERENTIAL - Abnormal; Notable for the following components:    IG# 0.05 (*)     All other components within normal limits   URINALYSIS, MICROSCOPIC - Abnormal; Notable for the following components:    Bacteria, UA Few (*)     Mucous, UA Moderate (*)     WBC, UA 6-10 (*)     Squamous Epithelial Cells, UA Moderate (*)     All other components within normal limits   COVID/FLU A&B PCR - Normal    Narrative:     The Xpert Xpress SARS-CoV-2/FLU/RSV plus is a rapid, multiplexed real-time PCR test intended for the simultaneous qualitative detection and differentiation of SARS-CoV-2, Influenza A, Influenza B, and respiratory syncytial virus (RSV) viral RNA in either nasopharyngeal swab or nasal swab specimens.         PREGNANCY TEST, URINE RAPID - Normal   MAGNESIUM - Normal   LIPASE - Normal   CHLAMYDIA/GONORRHOEAE(GC), PCR   COMPREHENSIVE METABOLIC PANEL          Imaging Results    None          Medications   cefTRIAXone injection 1 g (1 g Intramuscular Given 12/17/23 2015)     Medical Decision Making  22 y/o female who presents with having IUD placed 11/22/23 and since then has had pelvic/abdominal pain. No n/v. No fever. No dysuria. Vaginal discharge white/red in color. She states she then started with headache and dizzy ness yesterday. No cough. She states she has appointment with obgyn this week and then possible ultrasound but the pain became worse so told to come in. She is sexually active.     Covid/flu neg. No leukocytosis. Afebrile. Labs unremarkable for acute findings. Gonorrhea/chlamydia sent out. Wet prep neg for trich / yeast but does have wbc. UA does have bacteria and few wbc. No nitrites. Pelvic she didn't have any CMT however  does have pelvic pressure upon palpation. Will cover for PID as she is having symptoms, has foreign body (IUD) and wbc on swab along in urine. She is following up with obgyn this week.     Amount and/or Complexity of Data Reviewed  Labs: ordered. Decision-making details documented in ED Course.      Additional MDM:   Differential Diagnosis:   Other: The following diagnoses were also considered and will be evaluated: PID, UTI and gastritis.                                   Clinical Impression:  Final diagnoses:  [N73.9] Pelvic inflammatory disease (Primary)          ED Disposition Condition    Discharge Stable          ED Prescriptions       Medication Sig Dispense Start Date End Date Auth. Provider    metroNIDAZOLE (FLAGYL) 500 MG tablet Take 1 tablet (500 mg total) by mouth every 12 (twelve) hours. for 14 days 28 tablet 12/17/2023 12/31/2023 Alyssia Wick FNP    doxycycline (VIBRAMYCIN) 100 MG Cap Take 1 capsule (100 mg total) by mouth every 12 (twelve) hours. for 14 days 28 capsule 12/17/2023 12/31/2023 Alyssia Wick FNP          Follow-up Information       Follow up With Specialties Details Why Contact Info    Ko Clayton, DO Family Medicine Call in 1 week As needed [id number]  [address]  [facility] [address]  [phone]      obgyn   keep appointment this week              Alyssia Wick FNP  12/17/23 2041

## 2023-12-18 NOTE — DISCHARGE INSTRUCTIONS
Doxycycline and flagyl twice a day for 14 days (take entire dose). Take with food. No sexual intercourse until course completed. Tylenol and ibuprofen for pain as needed. Keep appointment with obgyn this week.

## 2024-05-02 ENCOUNTER — LAB VISIT (OUTPATIENT)
Dept: LAB | Facility: HOSPITAL | Age: 24
End: 2024-05-02
Attending: OBSTETRICS & GYNECOLOGY
Payer: COMMERCIAL

## 2024-05-02 DIAGNOSIS — Z32.01 PREGNANCY EXAMINATION OR TEST, POSITIVE RESULT: Primary | ICD-10-CM

## 2024-05-02 LAB — B-HCG FREE SERPL-ACNC: ABNORMAL MIU/ML

## 2024-05-02 PROCEDURE — 84702 CHORIONIC GONADOTROPIN TEST: CPT

## 2024-05-02 PROCEDURE — 36415 COLL VENOUS BLD VENIPUNCTURE: CPT

## 2024-05-02 PROCEDURE — 84144 ASSAY OF PROGESTERONE: CPT

## 2024-05-03 LAB — PROGEST SERPL-MCNC: 14 NG/ML

## 2024-07-24 ENCOUNTER — LAB VISIT (OUTPATIENT)
Dept: LAB | Facility: HOSPITAL | Age: 24
End: 2024-07-24
Attending: OBSTETRICS & GYNECOLOGY
Payer: COMMERCIAL

## 2024-07-24 DIAGNOSIS — R03.0 ELEVATED BLOOD PRESSURE READING WITHOUT DIAGNOSIS OF HYPERTENSION: Primary | ICD-10-CM

## 2024-07-24 LAB
ALBUMIN SERPL-MCNC: 3.2 G/DL (ref 3.5–5)
ALBUMIN/GLOB SERPL: 0.8 RATIO (ref 1.1–2)
ALP SERPL-CCNC: 158 UNIT/L (ref 40–150)
ALT SERPL-CCNC: 64 UNIT/L (ref 0–55)
ANION GAP SERPL CALC-SCNC: 9 MEQ/L
AST SERPL-CCNC: 51 UNIT/L (ref 5–34)
BASOPHILS # BLD AUTO: 0.02 X10(3)/MCL
BASOPHILS NFR BLD AUTO: 0.2 %
BILIRUB SERPL-MCNC: 0.2 MG/DL
BUN SERPL-MCNC: 9 MG/DL (ref 7–18.7)
CALCIUM SERPL-MCNC: 10.3 MG/DL (ref 8.4–10.2)
CHLORIDE SERPL-SCNC: 103 MMOL/L (ref 98–107)
CO2 SERPL-SCNC: 24 MMOL/L (ref 22–29)
CREAT SERPL-MCNC: 0.71 MG/DL (ref 0.55–1.02)
CREAT/UREA NIT SERPL: 13
EOSINOPHIL # BLD AUTO: 0.19 X10(3)/MCL (ref 0–0.9)
EOSINOPHIL NFR BLD AUTO: 1.6 %
ERYTHROCYTE [DISTWIDTH] IN BLOOD BY AUTOMATED COUNT: 13.6 % (ref 11.5–17)
GFR SERPLBLD CREATININE-BSD FMLA CKD-EPI: >60 ML/MIN/1.73/M2
GLOBULIN SER-MCNC: 3.8 GM/DL (ref 2.4–3.5)
GLUCOSE SERPL-MCNC: 87 MG/DL (ref 74–100)
HCT VFR BLD AUTO: 30.8 % (ref 37–47)
HGB BLD-MCNC: 10.9 G/DL (ref 12–16)
IMM GRANULOCYTES # BLD AUTO: 0.1 X10(3)/MCL (ref 0–0.04)
IMM GRANULOCYTES NFR BLD AUTO: 0.9 %
LDH SERPL-CCNC: 239 U/L (ref 125–220)
LYMPHOCYTES # BLD AUTO: 1.39 X10(3)/MCL (ref 0.6–4.6)
LYMPHOCYTES NFR BLD AUTO: 11.9 %
MCH RBC QN AUTO: 30.9 PG (ref 27–31)
MCHC RBC AUTO-ENTMCNC: 35.4 G/DL (ref 33–36)
MCV RBC AUTO: 87.3 FL (ref 80–94)
MONOCYTES # BLD AUTO: 0.51 X10(3)/MCL (ref 0.1–1.3)
MONOCYTES NFR BLD AUTO: 4.4 %
NEUTROPHILS # BLD AUTO: 9.45 X10(3)/MCL (ref 2.1–9.2)
NEUTROPHILS NFR BLD AUTO: 81 %
PLATELET # BLD AUTO: 287 X10(3)/MCL (ref 130–400)
PMV BLD AUTO: 9.7 FL (ref 7.4–10.4)
POTASSIUM SERPL-SCNC: 3.5 MMOL/L (ref 3.5–5.1)
PROT SERPL-MCNC: 7 GM/DL (ref 6.4–8.3)
RBC # BLD AUTO: 3.53 X10(6)/MCL (ref 4.2–5.4)
SODIUM SERPL-SCNC: 136 MMOL/L (ref 136–145)
URATE SERPL-MCNC: 3.3 MG/DL (ref 2.6–6)
WBC # BLD AUTO: 11.66 X10(3)/MCL (ref 4.5–11.5)

## 2024-07-24 PROCEDURE — 84550 ASSAY OF BLOOD/URIC ACID: CPT

## 2024-07-24 PROCEDURE — 85025 COMPLETE CBC W/AUTO DIFF WBC: CPT

## 2024-07-24 PROCEDURE — 80053 COMPREHEN METABOLIC PANEL: CPT

## 2024-07-24 PROCEDURE — 83615 LACTATE (LD) (LDH) ENZYME: CPT

## 2024-07-24 PROCEDURE — 36415 COLL VENOUS BLD VENIPUNCTURE: CPT

## 2024-07-29 ENCOUNTER — LAB VISIT (OUTPATIENT)
Dept: LAB | Facility: HOSPITAL | Age: 24
End: 2024-07-29
Attending: OBSTETRICS & GYNECOLOGY
Payer: COMMERCIAL

## 2024-07-29 DIAGNOSIS — R03.0 ELEVATED BLOOD PRESSURE READING WITHOUT DIAGNOSIS OF HYPERTENSION: Primary | ICD-10-CM

## 2024-07-29 LAB
CREAT CL 24H UR+SERPL-VRATE: 110.3 ML/MIN (ref 70–157)
CREAT SERPL-MCNC: 0.71 MG/DL (ref 0.55–1.02)
CREAT SERPL-MCNC: 0.71 MG/DL (ref 0.55–1.02)
CREAT UR-MCNC: 47 MG/DL (ref 45–106)
CREATININE, SER - MANUAL VALUE (OHS): 0.71
GFR SERPLBLD CREATININE-BSD FMLA CKD-EPI: >60 ML/MIN/1.73/M2
HRS COLLECTED CREATININE (OHS): 24 HR
PROT 24H UR-MCNC: NORMAL G/DL
PROT UR STRIP-MCNC: <6.8 MG/DL
TOTAL VOLUME  (OHS): 2400 ML
TOTAL VOLUME  (OHS): 2400 ML

## 2024-07-29 PROCEDURE — 82575 CREATININE CLEARANCE TEST: CPT

## 2024-07-29 PROCEDURE — 36415 COLL VENOUS BLD VENIPUNCTURE: CPT

## 2024-07-29 PROCEDURE — 84156 ASSAY OF PROTEIN URINE: CPT

## 2024-08-29 ENCOUNTER — LAB VISIT (OUTPATIENT)
Dept: LAB | Facility: HOSPITAL | Age: 24
End: 2024-08-29
Attending: OBSTETRICS & GYNECOLOGY
Payer: MEDICAID

## 2024-08-29 DIAGNOSIS — E03.9 HYPOTHYROIDISM, UNSPECIFIED TYPE: Primary | ICD-10-CM

## 2024-08-29 DIAGNOSIS — E05.90 HYPERTHYROIDISM, MATERNAL, ANTEPARTUM, SECOND TRIMESTER: ICD-10-CM

## 2024-08-29 DIAGNOSIS — O99.282 HYPERTHYROIDISM, MATERNAL, ANTEPARTUM, SECOND TRIMESTER: ICD-10-CM

## 2024-08-29 LAB
T4 FREE SERPL-MCNC: 0.8 NG/DL (ref 0.7–1.48)
TSH SERPL-ACNC: 1.89 UIU/ML (ref 0.35–4.94)

## 2024-08-29 PROCEDURE — 84443 ASSAY THYROID STIM HORMONE: CPT

## 2024-08-29 PROCEDURE — 36415 COLL VENOUS BLD VENIPUNCTURE: CPT

## 2024-08-29 PROCEDURE — 84439 ASSAY OF FREE THYROXINE: CPT

## 2024-09-20 ENCOUNTER — LAB VISIT (OUTPATIENT)
Dept: LAB | Facility: HOSPITAL | Age: 24
End: 2024-09-20
Attending: OBSTETRICS & GYNECOLOGY
Payer: MEDICAID

## 2024-09-20 DIAGNOSIS — O30.042 DICHORIONIC DIAMNIOTIC TWIN PREGNANCY IN SECOND TRIMESTER: Primary | ICD-10-CM

## 2024-09-20 DIAGNOSIS — E05.90 HYPERTHYROIDISM, MATERNAL, ANTEPARTUM, SECOND TRIMESTER: ICD-10-CM

## 2024-09-20 DIAGNOSIS — Z3A.25 25 WEEKS GESTATION OF PREGNANCY: ICD-10-CM

## 2024-09-20 DIAGNOSIS — O99.282 HYPERTHYROIDISM, MATERNAL, ANTEPARTUM, SECOND TRIMESTER: ICD-10-CM

## 2024-09-20 LAB
ERYTHROCYTE [DISTWIDTH] IN BLOOD BY AUTOMATED COUNT: 11.9 % (ref 11.5–17)
FERRITIN SERPL-MCNC: 11.68 NG/ML (ref 4.63–204)
FOLATE SERPL-MCNC: 18.2 NG/ML (ref 7–31.4)
HCT VFR BLD AUTO: 28.1 % (ref 37–47)
HGB BLD-MCNC: 9.5 G/DL (ref 12–16)
IRON SATN MFR SERPL: 10 % (ref 20–50)
IRON SERPL-MCNC: 39 UG/DL (ref 50–170)
MCH RBC QN AUTO: 28.8 PG (ref 27–31)
MCHC RBC AUTO-ENTMCNC: 33.8 G/DL (ref 33–36)
MCV RBC AUTO: 85.2 FL (ref 80–94)
PLATELET # BLD AUTO: 283 X10(3)/MCL (ref 130–400)
PMV BLD AUTO: 11.7 FL (ref 7.4–10.4)
RBC # BLD AUTO: 3.3 X10(6)/MCL (ref 4.2–5.4)
RET# (OHS): 0.08 X10E6/UL (ref 0.02–0.08)
RETICULOCYTE COUNT AUTOMATED (OLG): 2.55 % (ref 1.1–2.1)
T4 FREE SERPL-MCNC: 0.67 NG/DL (ref 0.7–1.48)
TIBC SERPL-MCNC: 347 UG/DL (ref 70–310)
TIBC SERPL-MCNC: 386 UG/DL (ref 250–450)
TRANSFERRIN SERPL-MCNC: 589 MG/DL (ref 180–382)
TSH SERPL-ACNC: 2.54 UIU/ML (ref 0.35–4.94)
VIT B12 SERPL-MCNC: 654 PG/ML (ref 213–816)
WBC # BLD AUTO: 10.07 X10(3)/MCL (ref 4.5–11.5)

## 2024-09-20 PROCEDURE — 82728 ASSAY OF FERRITIN: CPT

## 2024-09-20 PROCEDURE — 84439 ASSAY OF FREE THYROXINE: CPT

## 2024-09-20 PROCEDURE — 83021 HEMOGLOBIN CHROMOTOGRAPHY: CPT

## 2024-09-20 PROCEDURE — 84466 ASSAY OF TRANSFERRIN: CPT

## 2024-09-20 PROCEDURE — 85045 AUTOMATED RETICULOCYTE COUNT: CPT

## 2024-09-20 PROCEDURE — 84443 ASSAY THYROID STIM HORMONE: CPT

## 2024-09-20 PROCEDURE — 36415 COLL VENOUS BLD VENIPUNCTURE: CPT

## 2024-09-20 PROCEDURE — 82746 ASSAY OF FOLIC ACID SERUM: CPT

## 2024-09-20 PROCEDURE — 82607 VITAMIN B-12: CPT

## 2024-09-20 PROCEDURE — 83550 IRON BINDING TEST: CPT

## 2024-09-20 PROCEDURE — 83020 HEMOGLOBIN ELECTROPHORESIS: CPT

## 2024-09-20 PROCEDURE — 83540 ASSAY OF IRON: CPT

## 2024-09-20 PROCEDURE — 85027 COMPLETE CBC AUTOMATED: CPT

## 2024-09-24 LAB
HGB A MFR BLD ELPH: 97.6 % (ref 95.8–98)
HGB A2 MFR BLD ELPH: 2.4 % (ref 2–3.3)
HGB F MFR BLD ELPH: 0 % (ref 0–0.9)
HGB FRACT BLD ELPH-IMP: NORMAL
M HPLC HB VARIANT, B: NORMAL

## 2025-03-18 DIAGNOSIS — Z30.017 NEXPLANON INSERTION: Primary | ICD-10-CM

## 2025-03-24 ENCOUNTER — TELEPHONE (OUTPATIENT)
Dept: GYNECOLOGY | Facility: CLINIC | Age: 25
End: 2025-03-24
Payer: MEDICAID

## 2025-03-24 ENCOUNTER — TELEPHONE (OUTPATIENT)
Dept: GYNECOLOGY | Facility: CLINIC | Age: 25
End: 2025-03-24

## 2025-03-24 ENCOUNTER — OFFICE VISIT (OUTPATIENT)
Dept: GYNECOLOGY | Facility: CLINIC | Age: 25
End: 2025-03-24
Payer: MEDICAID

## 2025-03-24 VITALS
TEMPERATURE: 99 F | SYSTOLIC BLOOD PRESSURE: 106 MMHG | RESPIRATION RATE: 18 BRPM | HEIGHT: 61 IN | BODY MASS INDEX: 30.62 KG/M2 | WEIGHT: 162.19 LBS | OXYGEN SATURATION: 98 % | DIASTOLIC BLOOD PRESSURE: 74 MMHG | HEART RATE: 108 BPM

## 2025-03-24 DIAGNOSIS — Z30.017 NEXPLANON INSERTION: ICD-10-CM

## 2025-03-24 DIAGNOSIS — Z30.41 ENCOUNTER FOR SURVEILLANCE OF CONTRACEPTIVE PILLS: Primary | ICD-10-CM

## 2025-03-24 DIAGNOSIS — Z30.017 ENCOUNTER FOR INITIAL PRESCRIPTION OF IMPLANTABLE SUBDERMAL CONTRACEPTIVE: Primary | ICD-10-CM

## 2025-03-24 LAB
B-HCG UR QL: NEGATIVE
CTP QC/QA: YES

## 2025-03-24 PROCEDURE — 3008F BODY MASS INDEX DOCD: CPT | Mod: CPTII,,,

## 2025-03-24 PROCEDURE — 3078F DIAST BP <80 MM HG: CPT | Mod: CPTII,,,

## 2025-03-24 PROCEDURE — 1159F MED LIST DOCD IN RCRD: CPT | Mod: CPTII,,,

## 2025-03-24 PROCEDURE — 99203 OFFICE O/P NEW LOW 30 MIN: CPT | Mod: S$PBB,,,

## 2025-03-24 PROCEDURE — 99215 OFFICE O/P EST HI 40 MIN: CPT | Mod: PBBFAC

## 2025-03-24 PROCEDURE — 3074F SYST BP LT 130 MM HG: CPT | Mod: CPTII,,,

## 2025-03-24 PROCEDURE — 81025 URINE PREGNANCY TEST: CPT | Mod: PBBFAC

## 2025-03-24 RX ORDER — IBUPROFEN 800 MG/1
TABLET ORAL
COMMUNITY

## 2025-03-24 RX ORDER — FLUTICASONE PROPIONATE 50 MCG
SPRAY, SUSPENSION (ML) NASAL
COMMUNITY
Start: 2023-01-10

## 2025-03-24 RX ORDER — MIRTAZAPINE 15 MG/1
15 TABLET, FILM COATED ORAL NIGHTLY
COMMUNITY

## 2025-03-24 RX ORDER — SERTRALINE HYDROCHLORIDE 100 MG/1
200 TABLET, FILM COATED ORAL
COMMUNITY

## 2025-03-24 RX ORDER — CLONAZEPAM 0.5 MG/1
TABLET ORAL
COMMUNITY

## 2025-03-24 NOTE — TELEPHONE ENCOUNTER
Please call patient to inform her that her Nexplanon insertion will need to be scheduled for next available June 25th at 1:50pm. I will send enough refills for her current OCPs until appt.

## 2025-03-24 NOTE — PROGRESS NOTES
[facility] -  Gynecology / Women's Health Clinic     Subjective:      Patient ID: Natasha Smallwood is a 24 y.o. female.    Chief Complaint:  Contraception      History of Present Illness:  The patient presents to the clinic for contraception consultation. LMP 3/17/25 lasting 7-8 days changing pads 2-3x/day, denies hx of heavy bleeding. Admits hx of Nexplanon and IUD use. Currently on OCP for contraception, requesting Nexplanon. Denies pelvic pain or abnormal vaginal bleeding or discharge.     GYN & OB History:  Patient's last menstrual period was 2025 (exact date).     OB History    Para Term  AB Living   1 1  1  2   SAB IAB Ectopic Multiple Live Births      1 2      # Outcome Date GA Lbr Asael/2nd Weight Sex Type Anes PTL Lv   1A       CS-Unspec   EDUARD   1B       CS-Unspec   EDUARD       Past Medical History:   Diagnosis Date    Abnormal Pap smear of cervix     Anxiety     Gestational HTN     Hypothyroidism         Past Surgical History:   Procedure Laterality Date    ADENOIDECTOMY      CERVICAL BIOPSY  W/ LOOP ELECTRODE EXCISION       SECTION  10/29/2024    COLPOSCOPY, CERVIX AND VAGINA, WITH ENDOCERVICAL CURETTAGE      SINUS SURGERY  2014    TONSILLECTOMY  2008    WISDOM TOOTH EXTRACTION  2018        Social History     Tobacco Use    Smoking status: Never     Passive exposure: Never    Smokeless tobacco: Never   Substance and Sexual Activity    Alcohol use: Not Currently    Drug use: Never    Sexual activity: Yes     Partners: Male        Current Outpatient Medications   Medication Instructions    busPIRone (BUSPAR) 15 mg, 3 times daily    clonazePAM (KLONOPIN) 0.5 MG tablet TAKE 1/2 TABLET BY MOUTH DAILY AS NEEDED FOR ANXIETY    drospirenone, contraceptive, 4 mg (28) Tab     fluticasone propionate (FLONASE) 50 mcg/actuation nasal spray instill 1 SPRAY IN EACH NOSTRIL DAILY    ibuprofen (ADVIL,MOTRIN) 800 MG tablet ibuprofen 800 mg tablet     "levocetirizine (XYZAL) 5 mg, Nightly    levothyroxine (SYNTHROID) 25 mcg, Oral, Before breakfast    mirtazapine (REMERON) 15 mg, Nightly    propranoloL (INDERAL) 20 mg, Oral, Every 8 hours PRN    sertraline (ZOLOFT) 200 mg    trazodone (DESYREL) 25 MG tablet Oral, Nightly       Review of patient's allergies indicates:  No Known Allergies      Review of Systems:  Review of Systems  Negative except for pertinent findings for positives per HPI.     Objective:     Physical Exam   Visit Vitals  /74   Pulse 108   Temp 98.5 °F (36.9 °C) (Oral)   Resp 18   Ht 5' 1" (1.549 m)   Wt 73.6 kg (162 lb 3.2 oz)   LMP 03/17/2025 (Exact Date)   SpO2 98%   BMI 30.65 kg/m²       GENERAL: Well-developed female. No acute distress.    SKIN: Normal to inspection, warm and intact.  PSYCHIATRIC: Patient is oriented to person, place, and time. Mood and affect are normal.    Assessment:       ICD-10-CM ICD-9-CM   1. Encounter for initial prescription of implantable subdermal contraceptive  Z30.017 V25.02   2. Nexplanon insertion  Z30.017 V25.5       Plan:     1. Encounter for initial prescription of implantable subdermal contraceptive  -     POCT urine pregnancy    2. Nexplanon insertion  -     Ambulatory referral/consult to Gynecology    UPT - neg  Reviewed the risks and benefits of the following contraceptive agents:  Barrier protection (need to use with every use, protection against STDs);  Combined hormonal methods of contraception including pills, patch and ring with use daily, weekly and monthly use  Progesterone only methods including pills and depo injection (risk of irregular bleeding, possible 5# weight gain in first year with Depo)  LARC including implants (Nexplanon) 3 years of protection against conception (risk of irregular bleeding for 3-6 months) and IUDs (Soo, Kyleena, Mirena and Paragard with their use for 3, 5 and 10 years). Soo, Kyleena and Mirena IUD has progesterone and many patients have decrease in their " cycles, some with amenorrhea and may also have irregular bleeding for first 3-6 months. Jo has no hormones, cycles continue and may be heavier.  Patient decided on Nexplanon for contraception, hx of nexplanon/IUD use. Currently on OCPs with other OBGYN provider until Nexplanon insertion, compliant. Patient would like to continue care with previous provider for GYN care.    Explained to pt that most common side effect is unpredictable bleeding. Periods may be heavier or lighter, longer or infrequent or bleeding between periods which may improve over time. Possible mood changes, headaches, acne and depression. Instructed to use condoms to prevent pregnancy from now to Nexplanon insertion and to prevent STDs     Call with any GYN concerns  Follow up in about 1 year (around 3/24/2026) for Annual.

## 2025-06-25 ENCOUNTER — PROCEDURE VISIT (OUTPATIENT)
Dept: GYNECOLOGY | Facility: CLINIC | Age: 25
End: 2025-06-25
Payer: MEDICAID

## 2025-06-25 VITALS
RESPIRATION RATE: 18 BRPM | OXYGEN SATURATION: 99 % | HEART RATE: 109 BPM | SYSTOLIC BLOOD PRESSURE: 130 MMHG | DIASTOLIC BLOOD PRESSURE: 87 MMHG | WEIGHT: 166.38 LBS | HEIGHT: 61 IN | BODY MASS INDEX: 31.41 KG/M2 | TEMPERATURE: 98 F

## 2025-06-25 DIAGNOSIS — Z30.017 NEXPLANON INSERTION: Primary | ICD-10-CM

## 2025-06-25 LAB
B-HCG UR QL: NEGATIVE
CTP QC/QA: YES

## 2025-06-25 PROCEDURE — 11981 INSERTION DRUG DLVR IMPLANT: CPT | Mod: PBBFAC

## 2025-06-25 PROCEDURE — 81025 URINE PREGNANCY TEST: CPT | Mod: PBBFAC

## 2025-06-25 PROCEDURE — 99499 UNLISTED E&M SERVICE: CPT | Mod: S$PBB,,,

## 2025-06-25 RX ORDER — LIDOCAINE HYDROCHLORIDE 10 MG/ML
5 INJECTION, SOLUTION INFILTRATION; PERINEURAL
Status: COMPLETED | OUTPATIENT
Start: 2025-06-25 | End: 2025-06-25

## 2025-06-25 RX ORDER — ALPRAZOLAM 0.5 MG/1
0.5 TABLET ORAL DAILY PRN
COMMUNITY
Start: 2025-06-04

## 2025-06-25 RX ADMIN — ETONOGESTREL 68 MG: 68 IMPLANT SUBCUTANEOUS at 01:06

## 2025-06-25 RX ADMIN — LIDOCAINE HYDROCHLORIDE 5 ML: 10 INJECTION, SOLUTION INFILTRATION; PERINEURAL at 02:06

## 2025-06-25 NOTE — PROCEDURES
Insertion of Nexplanon    Date/Time: 6/25/2025 1:50 PM    Performed by: Yeny Regalado FNP  Authorized by: Yeny Regalado FNP    Consent:   Consent obtained:  Prior to procedure the appropriate consent was completed and verified  Consent given by:  Patient  Patient questions answered: yes    Patient agrees, verbalizes understanding, and wants to proceed: yes    Educational handouts given: yes    Instructions and paperwork completed: yes    Pre-Procedure:   Pre-procedure timeout performed: yes    Prepped with: alcohol 70% and chlorhexidine gluconate    Local anesthetic:  Lidocaine 1%  The site was cleaned and prepped in a sterile fashion: yes       Nexplanon Placement:     UPT - neg    The area for insertion was inspected. The area was identified on the inner side of the non-dominant LEFT upper arm 8-10 cm from the medial epicondyle of the humerus and 3-5 cm below the sulcus. The site was cleaned with antiseptic solution. The insertion site was injected with 2 mL of 1% Lidocaine. The Nexplanon insertion device was then used to insert the contraceptive implant romi just under the skin. The insertion device was removed without difficulty and the presence of the implant was then confirmed by palpation. The patient was also able to palpate and confirm placement of the Nexplanon.  A steri-strip was placed over the puncture site and a pressure bandage was applied to the upper arm for 24 hours. The patient tolerated the procedure well.       Explained to pt that most common side effect is unpredictable bleeding. Periods may be heavier or lighter, longer or infrequent or bleeding between periods which may improve over time. Possible mood changes, headaches, acne and depression. Instructed to use condoms to prevent pregnancy from now to Nexplanon insertion and to prevent STDs     Patient given bleeding precautions in the event she experiences heavy vaginal bleeding. Use back up protection for 7 days.     Lot  #:[id number]  Expiration Date: 6/30/2027      Insertion Procedure:   Small stab incision was made in arm: yes    Left/right:  left arm   68 mg etonogestreL 68 mg  Preloaded Implanon trocar was placed subdermally: yes    Visualization of implant was obtained: yes    Nexplanon was inserted and trocar removed: yes    Visualization of notch in stilette and palpitation of device: yes    Palpation confirms placement by provider and patient: yes    Site was closed with steri-strips and pressure bandage applied: yes